# Patient Record
Sex: FEMALE | Race: OTHER | Employment: OTHER | URBAN - METROPOLITAN AREA
[De-identification: names, ages, dates, MRNs, and addresses within clinical notes are randomized per-mention and may not be internally consistent; named-entity substitution may affect disease eponyms.]

---

## 2024-01-05 ENCOUNTER — APPOINTMENT (OUTPATIENT)
Dept: GENERAL RADIOLOGY | Age: 84
DRG: 871 | End: 2024-01-05
Payer: COMMERCIAL

## 2024-01-05 ENCOUNTER — HOSPITAL ENCOUNTER (INPATIENT)
Age: 84
LOS: 1 days | DRG: 871 | End: 2024-01-06
Attending: INTERNAL MEDICINE | Admitting: INTERNAL MEDICINE
Payer: COMMERCIAL

## 2024-01-05 DIAGNOSIS — E86.0 DEHYDRATION: ICD-10-CM

## 2024-01-05 DIAGNOSIS — R79.89 ELEVATED TROPONIN: ICD-10-CM

## 2024-01-05 DIAGNOSIS — I46.9 CARDIAC ARREST (HCC): ICD-10-CM

## 2024-01-05 DIAGNOSIS — N17.9 AKI (ACUTE KIDNEY INJURY) (HCC): ICD-10-CM

## 2024-01-05 DIAGNOSIS — R79.89 ELEVATED BRAIN NATRIURETIC PEPTIDE (BNP) LEVEL: Primary | ICD-10-CM

## 2024-01-05 DIAGNOSIS — R05.1 ACUTE COUGH: ICD-10-CM

## 2024-01-05 PROBLEM — R57.9 SHOCK CIRCULATORY (HCC): Status: ACTIVE | Noted: 2024-01-05

## 2024-01-05 PROBLEM — E87.1 HYPONATREMIA: Status: ACTIVE | Noted: 2024-01-05

## 2024-01-05 PROBLEM — J18.9 PNEUMONIA, UNSPECIFIED ORGANISM: Status: ACTIVE | Noted: 2024-01-05

## 2024-01-05 PROBLEM — J90 PLEURAL EFFUSION, BILATERAL: Status: ACTIVE | Noted: 2024-01-05

## 2024-01-05 PROBLEM — J96.01 ACUTE RESPIRATORY FAILURE WITH HYPOXIA (HCC): Status: ACTIVE | Noted: 2024-01-05

## 2024-01-05 PROBLEM — T68.XXXA HYPOTHERMIA: Status: ACTIVE | Noted: 2024-01-05

## 2024-01-05 PROBLEM — E87.5 HYPERKALEMIA: Status: ACTIVE | Noted: 2024-01-05

## 2024-01-05 LAB
ALBUMIN SERPL-MCNC: 4 G/DL (ref 3.4–5)
ALBUMIN/GLOB SERPL: 1.3 {RATIO} (ref 1.1–2.2)
ALP SERPL-CCNC: 119 U/L (ref 40–129)
ALT SERPL-CCNC: 83 U/L (ref 10–40)
ANION GAP SERPL CALCULATED.3IONS-SCNC: 17 MMOL/L (ref 3–16)
AST SERPL-CCNC: 83 U/L (ref 15–37)
BASOPHILS # BLD: 0 K/UL (ref 0–0.2)
BASOPHILS NFR BLD: 0.2 %
BILIRUB SERPL-MCNC: 0.4 MG/DL (ref 0–1)
BUN SERPL-MCNC: 64 MG/DL (ref 7–20)
CALCIUM SERPL-MCNC: 9.4 MG/DL (ref 8.3–10.6)
CHLORIDE SERPL-SCNC: 93 MMOL/L (ref 99–110)
CO2 SERPL-SCNC: 16 MMOL/L (ref 21–32)
CREAT SERPL-MCNC: 1.3 MG/DL (ref 0.6–1.2)
DEPRECATED RDW RBC AUTO: 14.2 % (ref 12.4–15.4)
EKG ATRIAL RATE: 85 BPM
EKG DIAGNOSIS: NORMAL
EKG P AXIS: 66 DEGREES
EKG P-R INTERVAL: 142 MS
EKG Q-T INTERVAL: 400 MS
EKG QRS DURATION: 126 MS
EKG QTC CALCULATION (BAZETT): 476 MS
EKG R AXIS: 40 DEGREES
EKG T AXIS: 190 DEGREES
EKG VENTRICULAR RATE: 85 BPM
EOSINOPHIL # BLD: 0 K/UL (ref 0–0.6)
EOSINOPHIL NFR BLD: 0 %
FLUAV RNA RESP QL NAA+PROBE: NOT DETECTED
FLUBV RNA RESP QL NAA+PROBE: NOT DETECTED
GFR SERPLBLD CREATININE-BSD FMLA CKD-EPI: 41 ML/MIN/{1.73_M2}
GLUCOSE SERPL-MCNC: 222 MG/DL (ref 70–99)
HCT VFR BLD AUTO: 34.2 % (ref 36–48)
HGB BLD-MCNC: 10.8 G/DL (ref 12–16)
LACTATE BLDV-SCNC: 11 MMOL/L (ref 0.4–1.9)
LACTATE BLDV-SCNC: 4.8 MMOL/L (ref 0.4–1.9)
LACTATE BLDV-SCNC: 9.1 MMOL/L (ref 0.4–2)
LYMPHOCYTES # BLD: 0.7 K/UL (ref 1–5.1)
LYMPHOCYTES NFR BLD: 5.7 %
MAGNESIUM SERPL-MCNC: 2.7 MG/DL (ref 1.8–2.4)
MCH RBC QN AUTO: 32.5 PG (ref 26–34)
MCHC RBC AUTO-ENTMCNC: 31.7 G/DL (ref 31–36)
MCV RBC AUTO: 102.5 FL (ref 80–100)
MONOCYTES # BLD: 0.7 K/UL (ref 0–1.3)
MONOCYTES NFR BLD: 5.2 %
NEUTROPHILS # BLD: 11.4 K/UL (ref 1.7–7.7)
NEUTROPHILS NFR BLD: 88.9 %
NT-PROBNP SERPL-MCNC: ABNORMAL PG/ML (ref 0–449)
PLATELET # BLD AUTO: 294 K/UL (ref 135–450)
PMV BLD AUTO: 10.2 FL (ref 5–10.5)
POTASSIUM SERPL-SCNC: 5.7 MMOL/L (ref 3.5–5.1)
PROCALCITONIN SERPL IA-MCNC: 0.2 NG/ML (ref 0–0.15)
PROT SERPL-MCNC: 7.2 G/DL (ref 6.4–8.2)
RBC # BLD AUTO: 3.34 M/UL (ref 4–5.2)
SARS-COV-2 RNA RESP QL NAA+PROBE: NOT DETECTED
SODIUM SERPL-SCNC: 126 MMOL/L (ref 136–145)
TROPONIN, HIGH SENSITIVITY: 130 NG/L (ref 0–14)
TROPONIN, HIGH SENSITIVITY: 185 NG/L (ref 0–14)
WBC # BLD AUTO: 12.8 K/UL (ref 4–11)

## 2024-01-05 PROCEDURE — 2000000000 HC ICU R&B

## 2024-01-05 PROCEDURE — 93010 ELECTROCARDIOGRAM REPORT: CPT | Performed by: INTERNAL MEDICINE

## 2024-01-05 PROCEDURE — 6360000002 HC RX W HCPCS: Performed by: PHYSICIAN ASSISTANT

## 2024-01-05 PROCEDURE — 94003 VENT MGMT INPAT SUBQ DAY: CPT

## 2024-01-05 PROCEDURE — 71045 X-RAY EXAM CHEST 1 VIEW: CPT

## 2024-01-05 PROCEDURE — 96365 THER/PROPH/DIAG IV INF INIT: CPT

## 2024-01-05 PROCEDURE — 83605 ASSAY OF LACTIC ACID: CPT

## 2024-01-05 PROCEDURE — 94761 N-INVAS EAR/PLS OXIMETRY MLT: CPT

## 2024-01-05 PROCEDURE — 2580000003 HC RX 258: Performed by: PHYSICIAN ASSISTANT

## 2024-01-05 PROCEDURE — 2580000003 HC RX 258: Performed by: INTERNAL MEDICINE

## 2024-01-05 PROCEDURE — 84484 ASSAY OF TROPONIN QUANT: CPT

## 2024-01-05 PROCEDURE — 96374 THER/PROPH/DIAG INJ IV PUSH: CPT

## 2024-01-05 PROCEDURE — 51798 US URINE CAPACITY MEASURE: CPT

## 2024-01-05 PROCEDURE — 87636 SARSCOV2 & INF A&B AMP PRB: CPT

## 2024-01-05 PROCEDURE — 83735 ASSAY OF MAGNESIUM: CPT

## 2024-01-05 PROCEDURE — 2500000003 HC RX 250 WO HCPCS: Performed by: EMERGENCY MEDICINE

## 2024-01-05 PROCEDURE — 87040 BLOOD CULTURE FOR BACTERIA: CPT

## 2024-01-05 PROCEDURE — 85025 COMPLETE CBC W/AUTO DIFF WBC: CPT

## 2024-01-05 PROCEDURE — 0BH17EZ INSERTION OF ENDOTRACHEAL AIRWAY INTO TRACHEA, VIA NATURAL OR ARTIFICIAL OPENING: ICD-10-PCS | Performed by: INTERNAL MEDICINE

## 2024-01-05 PROCEDURE — 2500000003 HC RX 250 WO HCPCS: Performed by: PHYSICIAN ASSISTANT

## 2024-01-05 PROCEDURE — 6370000000 HC RX 637 (ALT 250 FOR IP): Performed by: PHYSICIAN ASSISTANT

## 2024-01-05 PROCEDURE — 6360000002 HC RX W HCPCS

## 2024-01-05 PROCEDURE — 80053 COMPREHEN METABOLIC PANEL: CPT

## 2024-01-05 PROCEDURE — 99285 EMERGENCY DEPT VISIT HI MDM: CPT

## 2024-01-05 PROCEDURE — 96361 HYDRATE IV INFUSION ADD-ON: CPT

## 2024-01-05 PROCEDURE — 31500 INSERT EMERGENCY AIRWAY: CPT

## 2024-01-05 PROCEDURE — 2700000000 HC OXYGEN THERAPY PER DAY

## 2024-01-05 PROCEDURE — 83880 ASSAY OF NATRIURETIC PEPTIDE: CPT

## 2024-01-05 PROCEDURE — 99223 1ST HOSP IP/OBS HIGH 75: CPT | Performed by: INTERNAL MEDICINE

## 2024-01-05 PROCEDURE — 02HV33Z INSERTION OF INFUSION DEVICE INTO SUPERIOR VENA CAVA, PERCUTANEOUS APPROACH: ICD-10-PCS | Performed by: INTERNAL MEDICINE

## 2024-01-05 PROCEDURE — 6360000002 HC RX W HCPCS: Performed by: EMERGENCY MEDICINE

## 2024-01-05 PROCEDURE — 96375 TX/PRO/DX INJ NEW DRUG ADDON: CPT

## 2024-01-05 PROCEDURE — 6370000000 HC RX 637 (ALT 250 FOR IP): Performed by: INTERNAL MEDICINE

## 2024-01-05 PROCEDURE — 5A1935Z RESPIRATORY VENTILATION, LESS THAN 24 CONSECUTIVE HOURS: ICD-10-PCS | Performed by: INTERNAL MEDICINE

## 2024-01-05 PROCEDURE — 93005 ELECTROCARDIOGRAM TRACING: CPT | Performed by: PHYSICIAN ASSISTANT

## 2024-01-05 PROCEDURE — 84145 PROCALCITONIN (PCT): CPT

## 2024-01-05 PROCEDURE — 36415 COLL VENOUS BLD VENIPUNCTURE: CPT

## 2024-01-05 PROCEDURE — 94002 VENT MGMT INPAT INIT DAY: CPT

## 2024-01-05 PROCEDURE — 5A12012 PERFORMANCE OF CARDIAC OUTPUT, SINGLE, MANUAL: ICD-10-PCS | Performed by: INTERNAL MEDICINE

## 2024-01-05 PROCEDURE — 96367 TX/PROPH/DG ADDL SEQ IV INF: CPT

## 2024-01-05 RX ORDER — ONDANSETRON 2 MG/ML
4 INJECTION INTRAMUSCULAR; INTRAVENOUS ONCE
Status: COMPLETED | OUTPATIENT
Start: 2024-01-05 | End: 2024-01-05

## 2024-01-05 RX ORDER — HEPARIN SODIUM 5000 [USP'U]/ML
5000 INJECTION, SOLUTION INTRAVENOUS; SUBCUTANEOUS 2 TIMES DAILY
Status: DISCONTINUED | OUTPATIENT
Start: 2024-01-06 | End: 2024-01-06

## 2024-01-05 RX ORDER — MIDAZOLAM HYDROCHLORIDE 1 MG/ML
1-10 INJECTION, SOLUTION INTRAVENOUS CONTINUOUS
Status: DISCONTINUED | OUTPATIENT
Start: 2024-01-05 | End: 2024-01-06

## 2024-01-05 RX ORDER — MORPHINE SULFATE 4 MG/ML
4 INJECTION, SOLUTION INTRAMUSCULAR; INTRAVENOUS ONCE
Status: COMPLETED | OUTPATIENT
Start: 2024-01-05 | End: 2024-01-05

## 2024-01-05 RX ORDER — ONDANSETRON 2 MG/ML
4 INJECTION INTRAMUSCULAR; INTRAVENOUS EVERY 6 HOURS PRN
Status: DISCONTINUED | OUTPATIENT
Start: 2024-01-05 | End: 2024-01-06

## 2024-01-05 RX ORDER — MORPHINE SULFATE 4 MG/ML
INJECTION, SOLUTION INTRAMUSCULAR; INTRAVENOUS
Status: COMPLETED
Start: 2024-01-05 | End: 2024-01-05

## 2024-01-05 RX ORDER — ACETAMINOPHEN 325 MG/1
650 TABLET ORAL EVERY 6 HOURS PRN
Status: DISCONTINUED | OUTPATIENT
Start: 2024-01-05 | End: 2024-01-07 | Stop reason: HOSPADM

## 2024-01-05 RX ORDER — SODIUM CHLORIDE 9 MG/ML
INJECTION, SOLUTION INTRAVENOUS PRN
Status: DISCONTINUED | OUTPATIENT
Start: 2024-01-05 | End: 2024-01-07 | Stop reason: HOSPADM

## 2024-01-05 RX ORDER — ONDANSETRON 4 MG/1
4 TABLET, ORALLY DISINTEGRATING ORAL EVERY 8 HOURS PRN
Status: DISCONTINUED | OUTPATIENT
Start: 2024-01-05 | End: 2024-01-06

## 2024-01-05 RX ORDER — SODIUM CHLORIDE 9 MG/ML
1000 INJECTION, SOLUTION INTRAVENOUS CONTINUOUS
Status: DISCONTINUED | OUTPATIENT
Start: 2024-01-05 | End: 2024-01-06

## 2024-01-05 RX ORDER — NALOXONE HYDROCHLORIDE 1 MG/ML
INJECTION INTRAMUSCULAR; INTRAVENOUS; SUBCUTANEOUS DAILY PRN
Status: COMPLETED | OUTPATIENT
Start: 2024-01-05 | End: 2024-01-05

## 2024-01-05 RX ORDER — ASPIRIN 81 MG/1
324 TABLET, CHEWABLE ORAL ONCE
Status: COMPLETED | OUTPATIENT
Start: 2024-01-05 | End: 2024-01-05

## 2024-01-05 RX ORDER — SODIUM CHLORIDE 0.9 % (FLUSH) 0.9 %
5-40 SYRINGE (ML) INJECTION EVERY 12 HOURS SCHEDULED
Status: DISCONTINUED | OUTPATIENT
Start: 2024-01-05 | End: 2024-01-07 | Stop reason: HOSPADM

## 2024-01-05 RX ORDER — EPINEPHRINE IN SOD CHLOR,ISO 1 MG/10 ML
SYRINGE (ML) INTRAVENOUS DAILY PRN
Status: COMPLETED | OUTPATIENT
Start: 2024-01-05 | End: 2024-01-05

## 2024-01-05 RX ORDER — SODIUM CHLORIDE 0.9 % (FLUSH) 0.9 %
5-40 SYRINGE (ML) INJECTION PRN
Status: DISCONTINUED | OUTPATIENT
Start: 2024-01-05 | End: 2024-01-07 | Stop reason: HOSPADM

## 2024-01-05 RX ORDER — ENOXAPARIN SODIUM 100 MG/ML
30 INJECTION SUBCUTANEOUS DAILY
Status: DISCONTINUED | OUTPATIENT
Start: 2024-01-05 | End: 2024-01-05

## 2024-01-05 RX ORDER — AZITHROMYCIN 250 MG/1
500 TABLET, FILM COATED ORAL EVERY 24 HOURS
Status: DISCONTINUED | OUTPATIENT
Start: 2024-01-06 | End: 2024-01-06

## 2024-01-05 RX ORDER — ONDANSETRON 2 MG/ML
4 INJECTION INTRAMUSCULAR; INTRAVENOUS ONCE
Status: DISCONTINUED | OUTPATIENT
Start: 2024-01-05 | End: 2024-01-05

## 2024-01-05 RX ORDER — MORPHINE SULFATE 4 MG/ML
4 INJECTION, SOLUTION INTRAMUSCULAR; INTRAVENOUS ONCE
Status: DISCONTINUED | OUTPATIENT
Start: 2024-01-05 | End: 2024-01-05

## 2024-01-05 RX ORDER — ACETAMINOPHEN 650 MG/1
650 SUPPOSITORY RECTAL EVERY 6 HOURS PRN
Status: DISCONTINUED | OUTPATIENT
Start: 2024-01-05 | End: 2024-01-07 | Stop reason: HOSPADM

## 2024-01-05 RX ORDER — CALCIUM GLUCONATE 94 MG/ML
1000 INJECTION, SOLUTION INTRAVENOUS ONCE
Status: COMPLETED | OUTPATIENT
Start: 2024-01-05 | End: 2024-01-05

## 2024-01-05 RX ORDER — ONDANSETRON 2 MG/ML
INJECTION INTRAMUSCULAR; INTRAVENOUS
Status: COMPLETED
Start: 2024-01-05 | End: 2024-01-05

## 2024-01-05 RX ORDER — HEPARIN SODIUM 5000 [USP'U]/ML
5000 INJECTION, SOLUTION INTRAVENOUS; SUBCUTANEOUS EVERY 8 HOURS SCHEDULED
Status: DISCONTINUED | OUTPATIENT
Start: 2024-01-06 | End: 2024-01-05

## 2024-01-05 RX ORDER — POLYETHYLENE GLYCOL 3350 17 G/17G
17 POWDER, FOR SOLUTION ORAL DAILY PRN
Status: DISCONTINUED | OUTPATIENT
Start: 2024-01-05 | End: 2024-01-07 | Stop reason: HOSPADM

## 2024-01-05 RX ADMIN — EPINEPHRINE 1 MG: 0.1 INJECTION, SOLUTION ENDOTRACHEAL; INTRACARDIAC; INTRAVENOUS at 13:18

## 2024-01-05 RX ADMIN — SODIUM BICARBONATE 50 MEQ: 84 INJECTION, SOLUTION INTRAVENOUS at 13:20

## 2024-01-05 RX ADMIN — AZITHROMYCIN MONOHYDRATE 500 MG: 500 INJECTION, POWDER, LYOPHILIZED, FOR SOLUTION INTRAVENOUS at 12:05

## 2024-01-05 RX ADMIN — ONDANSETRON 4 MG: 2 INJECTION INTRAMUSCULAR; INTRAVENOUS at 12:55

## 2024-01-05 RX ADMIN — NALOXONE HYDROCHLORIDE 0.2 MG: 1 INJECTION PARENTERAL at 13:15

## 2024-01-05 RX ADMIN — EPINEPHRINE 1 MG: 0.1 INJECTION, SOLUTION ENDOTRACHEAL; INTRACARDIAC; INTRAVENOUS at 13:25

## 2024-01-05 RX ADMIN — MUPIROCIN: 20 OINTMENT TOPICAL at 20:31

## 2024-01-05 RX ADMIN — MORPHINE SULFATE 4 MG: 4 INJECTION, SOLUTION INTRAMUSCULAR; INTRAVENOUS at 12:56

## 2024-01-05 RX ADMIN — EPINEPHRINE 2 MCG/MIN: 1 INJECTION INTRAMUSCULAR; INTRAVENOUS; SUBCUTANEOUS at 13:49

## 2024-01-05 RX ADMIN — EPINEPHRINE 1 MG: 0.1 INJECTION, SOLUTION ENDOTRACHEAL; INTRACARDIAC; INTRAVENOUS at 14:12

## 2024-01-05 RX ADMIN — SODIUM CHLORIDE 1000 ML: 9 INJECTION, SOLUTION INTRAVENOUS at 10:06

## 2024-01-05 RX ADMIN — SODIUM CHLORIDE 5 MCG/MIN: 9 INJECTION, SOLUTION INTRAVENOUS at 14:04

## 2024-01-05 RX ADMIN — SODIUM ZIRCONIUM CYCLOSILICATE 10 G: 10 POWDER, FOR SUSPENSION ORAL at 09:50

## 2024-01-05 RX ADMIN — CALCIUM GLUCONATE 1000 MG: 98 INJECTION, SOLUTION INTRAVENOUS at 09:51

## 2024-01-05 RX ADMIN — Medication 10 ML: at 20:32

## 2024-01-05 RX ADMIN — CEFTRIAXONE SODIUM 1000 MG: 1 INJECTION, POWDER, FOR SOLUTION INTRAMUSCULAR; INTRAVENOUS at 11:32

## 2024-01-05 RX ADMIN — EPINEPHRINE 1 MG: 0.1 INJECTION, SOLUTION ENDOTRACHEAL; INTRACARDIAC; INTRAVENOUS at 13:21

## 2024-01-05 RX ADMIN — SODIUM BICARBONATE 50 MEQ: 84 INJECTION, SOLUTION INTRAVENOUS at 13:25

## 2024-01-05 RX ADMIN — ONDANSETRON 4 MG: 2 INJECTION INTRAMUSCULAR; INTRAVENOUS at 09:02

## 2024-01-05 RX ADMIN — MIDAZOLAM IN SODIUM CHLORIDE 2 MG/HR: 1 INJECTION INTRAVENOUS at 14:20

## 2024-01-05 RX ADMIN — MORPHINE SULFATE 4 MG: 4 INJECTION, SOLUTION INTRAMUSCULAR; INTRAVENOUS at 09:03

## 2024-01-05 RX ADMIN — ASPIRIN 81 MG 324 MG: 81 TABLET ORAL at 09:51

## 2024-01-05 ASSESSMENT — PULMONARY FUNCTION TESTS
PIF_VALUE: 11
PIF_VALUE: 15
PIF_VALUE: 21
PIF_VALUE: 21
PIF_VALUE: 27
PIF_VALUE: 17
PIF_VALUE: 18
PIF_VALUE: 14
PIF_VALUE: 14
PIF_VALUE: 19
PIF_VALUE: 20
PIF_VALUE: 19
PIF_VALUE: 14
PIF_VALUE: 18

## 2024-01-05 ASSESSMENT — PAIN - FUNCTIONAL ASSESSMENT: PAIN_FUNCTIONAL_ASSESSMENT: 0-10

## 2024-01-05 ASSESSMENT — PAIN SCALES - GENERAL
PAINLEVEL_OUTOF10: 4
PAINLEVEL_OUTOF10: 0

## 2024-01-05 NOTE — PROGRESS NOTES
PULM/CCM    Patient's clinical status prognosis and options discussed in detail with patient's family and after discussion patient's CODE STATUS been changed to DNR CC  Case discussed with ER providers    Norbert Celeste MD

## 2024-01-05 NOTE — ED PROVIDER NOTES
[facility] C2 CARD TELEMETRY  Emergency Department Encounter    Patient Name: Veronica Barton  MRN: [id number]  YOB: 1940  Date of Evaluation: 1/5/2024  Provider: Cal Raphael MD  Note Started: 8:41 AM EST 1/5/24    CHIEF COMPLAINT  Shortness of Breath (Edema in lower extremities, sob, medics were unable to get room air sat, sick the past couple of days. Lost appetite, )    SHARED SERVICE VISIT  Evaluated by GIOVANNI.  My supervising physician was available for consultation.     HISTORY OF PRESENT ILLNESS  Veronica Barton is a 83 y.o. female who presents to the ED several day history of URI-like symptoms.  Patient with worsening shortness of breath today.  Patient speaks Gujarati and history obtained through help of daughter-in-law.  Patient with several day history of malaise and fatigue with decreased appetite.  Has had mild cough.  Nonproductive.  Non-smoker.  Is also noted some lower extremity swelling.  Patient reports some mild chest tightness and pain in the upper back.  Mildly lightheaded without dizziness or confusion.  No nasal congestion or sore throat.  They deny fevers chills.  Patient denies abdominal pain.  No nausea vomiting.  Mild burning with urination.  Several family members with URI symptoms recently.    No other complaints, modifying factors or associated symptoms.     Nursing notes reviewed were all reviewed and agreed with or any disagreements were addressed in the HPI.    PMH:  Past Medical History:   Diagnosis Date    Thyroid disease        Surgical History:  History reviewed. No pertinent surgical history.    Family History:  History reviewed. No pertinent family history.    Social History:  Social History     Socioeconomic History    Marital status:      Spouse name: Not on file    Number of children: Not on file    Years of education: Not on file    Highest education level: Not on file   Occupational History    Not on file   Tobacco Use    Smoking status: Never

## 2024-01-05 NOTE — PROGRESS NOTES
1720 Received to ICU via bed from ED, Intubated & vented. CHG bath done, skin checked. Oral suctioned & oral care given. Hair combed. RT at bedside monitoring sats. Sat currently %. Turned & repositioned with pillows for support. IV medications- Levo @ 11, Epi @ 8 and Versed @ 2.  1745  Family at bedside & updated.  Samara Carrion RN

## 2024-01-05 NOTE — PROGRESS NOTES
01/05/24 1715   Patient Observation   Pulse 95   Respirations 18   SpO2 100 %   Vent Information   Vent Mode AC/VC   $Ventilation $Initial Day   Ventilator Settings   FiO2  100 %   Vt (Set, mL) 350 mL   Resp Rate (Set) 18 bpm   PEEP/CPAP (cmH2O) 5   Vent Patient Data (Readings)   Vt (Measured) 525 mL   Peak Inspiratory Pressure (cmH2O) 19 cmH2O   Rate Measured 20 br/min   Minute Volume (L/min) 7.61 Liters   Mean Airway Pressure (cmH2O) 9.1 cmH20   Plateau Pressure (cm H2O) 0 cm H2O   Driving Pressure -5   I:E Ratio 1:1.90   I Time/ I Time % 1.1 s   Vent Alarm Settings   High Pressure (cmH2O) 45 cmH2O   Low Minute Volume (lpm) 2 L/min   Low Exhaled Vt (ml) 200 mL   RR High (bpm) 40 br/min   Apnea (secs) 20 secs   Additional Respiratoray Assessments   Humidification Source HME   Ambu Bag With Mask At Bedside Yes   Airway Clearance   Suction ET Tube   Suction Device Inline suction catheter   Sputum Method Obtained Endotracheal   Sputum Amount Small   Sputum Color/Odor White   Sputum Consistency Thin

## 2024-01-05 NOTE — H&P
support for now. Vasopressor support, empiric Abx. Bicarb was given during ACLS. Prognosis is grim given multiple arrests in this setting. Family initially elected DNR status. Currently listed as Limited but would need to further explore wishes regarding re-intubation pending clinical course. Will continue above aggressive measures pending any change in goals of care. Further management per Critical Care team.     Acute Respiratory Failure with Hypoxia: 2/2 Pneumonia and Cardiac Arrest. Uncertain sequence of events, but pneumonia respiratory distress/arrest seemed to precede the Cardiac Arrest per ED report. PEA Arrest presumably due to hypoxia, hypotension and/or acidosis. Despite the BNP of 70,000, does not appears to be pulmonary edema on CXR. Continue vent support per Critical Care.     Pneumonia: Continue empiric Abx. BCx's pending. Flu/COVID testing was negative.     Elevated Troponin: Most likely demand ischemia in setting of Pneumonia/Sepsis. Technically new LBBB, but primary ACS seems unlikely based on the context and downward troponin trend, therefore risks of AC felt to outweigh benefits.     CONSTANTINO: Continue volume expansion. Monitor.       Discussed management and the need for Hospitalization of the patient w/ the Emergency Department Provider: MELISSA Valverde and Dr Dubose. Discussed management with Critical Care team.       CXR: I have reviewed the CXR with the following interpretation: Dense right basilar pneumonia  EKG:  I have reviewed the EKG with the following interpretation: NSR, left bundle branch block. Only comparison from 2014 which did not show the LBBB.     Physical Exam Performed:      BP (!) 86/54   Pulse (!) 107   Temp 97 °F (36.1 °C) (Rectal)   Resp 19   Wt 40.4 kg (89 lb)   SpO2 100%   BMI 16.28 kg/m²   General appearance: Intubated, sedated, increased work of breathing.   HENT: Oropharynx clear, mucous membranes moist.  Respiratory: On vent. Limited examination, grossly  Recommend follow-up to resolution. Cardiomegaly and bilateral pleural effusions may be related to mild CHF.       PCP: Cal Raphael MD    Past Medical History:        Diagnosis Date    Thyroid disease        Past Surgical History:    History reviewed. No pertinent surgical history.    Medications Prior to Admission:   Prior to Admission medications    Medication Sig Start Date End Date Taking? Authorizing Provider   dicyclomine (BENTYL) 10 MG capsule Take 10 mg by mouth nightly.    Provider, MD Hazel       Labs: Personally reviewed and interpreted for clinical significance.   Recent Labs     01/05/24  0843   WBC 12.8*   HGB 10.8*   HCT 34.2*        Recent Labs     01/05/24  0843   *   K 5.7*   CL 93*   CO2 16*   BUN 64*   CREATININE 1.3*   CALCIUM 9.4   MG 2.70*     Recent Labs     01/05/24  0843 01/05/24  1056   PROBNP >70,000*  --    TROPHS 185* 130*     No results for input(s): \"LABA1C\" in the last 72 hours.  Recent Labs     01/05/24  0843   AST 83*   ALT 83*   BILITOT 0.4   ALKPHOS 119     No results for input(s): \"INR\", \"LACTA\", \"TSH\" in the last 72 hours.     Tommy Wray MD

## 2024-01-05 NOTE — ED NOTES
Patient adjusted in bed for comfort. She is on a purewick for urine collection. None noted at this time. Warm blankets applied. Patient denies any pain at this time. Daughter in law at pt bedside assisting with any questions.

## 2024-01-05 NOTE — CONSULTS
INPATIENT PULMONARY CRITICAL CARE CONSULT NOTE      Chief Complaint/Referring Provider:  Patient is being seen at the request of Dr. Wray for a consultation for Acute respiratory failure       Presenting HPI: Patient was brought to the hospital because of increasing shortness of breath and leg edema      As per ER provider-Veronica Barton is a 83 y.o. female who presents to the ED several day history of URI-like symptoms.  Patient with worsening shortness of breath today.  Patient speaks Gujarati and history obtained through help of daughter-in-law.  Patient with several day history of malaise and fatigue with decreased appetite.  Has had mild cough.  Nonproductive.  Non-smoker.  Is also noted some lower extremity swelling.  Patient reports some mild chest tightness and pain in the upper back.  Mildly lightheaded without dizziness or confusion.  No nasal congestion or sore throat.  They deny fevers chills.  Patient denies abdominal pain.  No nausea vomiting.  Mild burning with urination.  Several family members with URI symptoms recently.     No other complaints, modifying factors or associated symptoms.     Patient apparently decompensated in the ER and patient had cardiopulmonary arrest and patient was resuscitated as per ACS protocol with establishment of ROSC, patient was put on mechanical vent support, patient when seen in the ER was critically ill on mechanical support, patient was having increased work of breathing on the ventilator, patient was on 100% oxygen with saturation 94% in the ER, patient also was hypothermic with temperature of 94.4 °F, patient was also hypotensive with systolic blood pressure of 60 mmHg, patient had sinus rhythm on the monitor, patient was started on IV pressors by the ER provider, patient's blood sugar was slightly on the high side, no other pertinent review of system could be obtained because of patient clinical status which was precarious and critical       Patient Active  Problem List    Diagnosis Date Noted    Pneumonia, unspecified organism 01/05/2024    Cardiopulmonary arrest with successful resuscitation ([facility]) 01/05/2024    Hypothermia 01/05/2024    Elevated brain natriuretic peptide (BNP) level 01/05/2024    Acute respiratory failure with hypoxia ([facility]) 01/05/2024    Shock circulatory ([facility]) 01/05/2024    Hyponatremia 01/05/2024    Elevated lactic acid level 01/05/2024    Hyperkalemia 01/05/2024    CONSTANTINO (acute kidney injury) ([facility]) 01/05/2024    Elevated troponin 01/05/2024    Pleural effusion, bilateral 01/05/2024       Past Medical History:   Diagnosis Date    Thyroid disease         History reviewed. No pertinent surgical history.     History reviewed. No pertinent family history.     Social History     Tobacco Use    Smoking status: Never    Smokeless tobacco: Not on file   Substance Use Topics    Alcohol use: No        No Known Allergies            Physical Exam:  Blood pressure (!) 60/49, pulse 67, temperature (!) 94.7 °F (34.8 °C), resp. rate 21, weight 40.4 kg (89 lb), SpO2 99 %.'     Constitutional: In significant respiratory distress on mechanical vent support when seen  HENT: Endotracheal tube present no thyromegaly.  Eyes:  Conjunctivae are normal. Pupils equal, round, and reactive to light. No scleral icterus.   Neck: . No tracheal deviation present. No obvious thyroid mass.   Cardiovascular: Normal rate, regular rhythm, normal heart sounds.  No right ventricular heave.  At least 1+Atleast 1+ lower extremity edema.  Pulmonary/Chest: No wheezes.  Mild lateral rales.  Chest wall is not dull to percussion.  Increased accessory muscle usage, no stridor.  Decreased breath sound intensity  Abdominal: Soft. Bowel sounds present. No distension or hernia. No tenderness.    Musculoskeletal: No cyanosis. No clubbing. No obvious joint deformity.   Lymphadenopathy: No cervical or supraclavicular adenopathy.   Skin: Skin is warm and dry. No rash or nodules on the exposed

## 2024-01-05 NOTE — ED PROVIDER NOTES
The Ekg interpreted by me shows  normal sinus rhythm with a rate of 85  Axis is   Normal  QTc is  476 msec  Intervals and Durations are unremarkable.      ST Segments: T wave inversion in leads I, aVL with ST T wave depression in V6.  When compared to an EKG performed on April 20, 2014 these changes are new    1400 Hrs - Addendum.  CODE BLUE was activated for room 2.  I heard the alarm and went into the room.  This patient was undergoing CPR.  I intubated the patient under implied consent.  I then directed the code.  The patient appeared to have slow rate on the monitor but no pulse (PEA).  ACLS was instituted.  After the third round bedside ultrasound was performed and the patient had evidence of organized cardiac activity with a pulse.  I spoke to the patient's family member who was in attendance in the ED along with Chloe who is a family member and a CVICU nurse.  They had asked me to stop CPR if the patient did not have a pulse at the next pulse check.  Given that she did have a pulse I asked them if they wanted to change the patient's CODE STATUS to DNR CC.  They plan to conference with the family and get back to [facility].  This patient has been accepted by the hospitalist for admission.  They were notified of the change in the patient's status.  The GIOVANNI is placed in a central line at bedside.    Intubation    Date/Time: 1/5/2024 2:03 PM    Performed by: Joseluis Dubose MD  Authorized by: Tommy Wray MD    Consent:     Consent obtained:  Emergent situation  Universal protocol:     Patient identity confirmed:  Verbally with patient and arm band  Pre-procedure details:     Indications: cardio/pulmonary arrest      Induction agents:  None    Paralytics:  None  Procedure details:     Preoxygenation:  Bag valve mask    CPR in progress: yes      Number of attempts:  1  Successful intubation attempt details:     Intubation method:  Oral    Intubation technique: video assisted      Laryngoscope blade:   Mac 4    Bougie used: no      Grade view: I      Tube size (mm):  7.5    Tube type:  Cuffed    Tube visualized through cords: yes    Placement assessment:     Tube secured with:  ETT burkett    Breath sounds:  Equal    Placement verification: direct visualization and equal breath sounds    Post-procedure details:     Procedure completion:  Tolerated well, no immediate complications    CRITICAL CARE TIME   I personally saw the patient and independently provided 20 minutes of non-concurrent critical care out of the total shared critical care time provided.  There was a high probability of clinically significant/life threatening deterioration in the patient's condition which required my urgent intervention.           Sedrick, Joseluis Lezama MD  01/05/24 4702

## 2024-01-05 NOTE — PROGRESS NOTES
PULM/CCM     Patient's family as per nursing changed the code status to limited code with no CPR and patient was transferred to ICU for further care     Norbert Celeste MD

## 2024-01-06 VITALS
HEIGHT: 58 IN | HEART RATE: 51 BPM | WEIGHT: 100.09 LBS | RESPIRATION RATE: 25 BRPM | OXYGEN SATURATION: 83 % | BODY MASS INDEX: 21.01 KG/M2 | SYSTOLIC BLOOD PRESSURE: 85 MMHG | DIASTOLIC BLOOD PRESSURE: 53 MMHG | TEMPERATURE: 99.4 F

## 2024-01-06 PROBLEM — D69.6 THROMBOCYTOPENIA (HCC): Status: ACTIVE | Noted: 2024-01-06

## 2024-01-06 PROBLEM — I35.0 AORTIC STENOSIS: Status: ACTIVE | Noted: 2024-01-06

## 2024-01-06 PROBLEM — I42.9 CARDIOMYOPATHY (HCC): Status: ACTIVE | Noted: 2024-01-06

## 2024-01-06 LAB
ANION GAP SERPL CALCULATED.3IONS-SCNC: 12 MMOL/L (ref 3–16)
BACTERIA BLD CULT ORG #2: NORMAL
BACTERIA BLD CULT: NORMAL
BASE EXCESS BLDA CALC-SCNC: -4.6 MMOL/L (ref -3–3)
BASOPHILS # BLD: 0 K/UL (ref 0–0.2)
BASOPHILS NFR BLD: 0 %
BUN SERPL-MCNC: 76 MG/DL (ref 7–20)
CALCIUM SERPL-MCNC: 8 MG/DL (ref 8.3–10.6)
CHLORIDE SERPL-SCNC: 99 MMOL/L (ref 99–110)
CO2 BLDA-SCNC: 19.6 MMOL/L
CO2 SERPL-SCNC: 20 MMOL/L (ref 21–32)
COHGB MFR BLDA: 0.2 % (ref 0–1.5)
CREAT SERPL-MCNC: 1.9 MG/DL (ref 0.6–1.2)
DEPRECATED RDW RBC AUTO: 13.6 % (ref 12.4–15.4)
EOSINOPHIL # BLD: 0 K/UL (ref 0–0.6)
EOSINOPHIL NFR BLD: 0 %
GFR SERPLBLD CREATININE-BSD FMLA CKD-EPI: 26 ML/MIN/{1.73_M2}
GLUCOSE SERPL-MCNC: 114 MG/DL (ref 70–99)
HCO3 BLDA-SCNC: 18.7 MMOL/L (ref 21–29)
HCT VFR BLD AUTO: 28.2 % (ref 36–48)
HGB BLD-MCNC: 9.3 G/DL (ref 12–16)
HGB BLDA-MCNC: 10.6 G/DL (ref 12–16)
LYMPHOCYTES # BLD: 0.7 K/UL (ref 1–5.1)
LYMPHOCYTES NFR BLD: 5 %
MACROCYTES BLD QL SMEAR: ABNORMAL
MCH RBC QN AUTO: 33.1 PG (ref 26–34)
MCHC RBC AUTO-ENTMCNC: 33 G/DL (ref 31–36)
MCV RBC AUTO: 100.3 FL (ref 80–100)
METHGB MFR BLDA: 0.9 %
MONOCYTES # BLD: 0.1 K/UL (ref 0–1.3)
MONOCYTES NFR BLD: 1 %
NEUTROPHILS # BLD: 12.7 K/UL (ref 1.7–7.7)
NEUTROPHILS NFR BLD: 60 %
NEUTS BAND NFR BLD MANUAL: 34 % (ref 0–7)
O2 THERAPY: ABNORMAL
OVALOCYTES BLD QL SMEAR: ABNORMAL
PCO2 BLDA: 28.7 MMHG (ref 35–45)
PH BLDA: 7.43 [PH] (ref 7.35–7.45)
PLATELET # BLD AUTO: 112 K/UL (ref 135–450)
PLATELET BLD QL SMEAR: ABNORMAL
PMV BLD AUTO: 9 FL (ref 5–10.5)
PO2 BLDA: 79.5 MMHG (ref 75–108)
POTASSIUM SERPL-SCNC: 5.5 MMOL/L (ref 3.5–5.1)
RBC # BLD AUTO: 2.82 M/UL (ref 4–5.2)
SAO2 % BLDA: 94 %
SLIDE REVIEW: ABNORMAL
SODIUM SERPL-SCNC: 131 MMOL/L (ref 136–145)
WBC # BLD AUTO: 13.5 K/UL (ref 4–11)

## 2024-01-06 PROCEDURE — 6360000002 HC RX W HCPCS: Performed by: INTERNAL MEDICINE

## 2024-01-06 PROCEDURE — 6360000002 HC RX W HCPCS: Performed by: PHYSICIAN ASSISTANT

## 2024-01-06 PROCEDURE — 94003 VENT MGMT INPAT SUBQ DAY: CPT

## 2024-01-06 PROCEDURE — 85025 COMPLETE CBC W/AUTO DIFF WBC: CPT

## 2024-01-06 PROCEDURE — 80048 BASIC METABOLIC PNL TOTAL CA: CPT

## 2024-01-06 PROCEDURE — 36592 COLLECT BLOOD FROM PICC: CPT

## 2024-01-06 PROCEDURE — 2580000003 HC RX 258: Performed by: INTERNAL MEDICINE

## 2024-01-06 PROCEDURE — 99291 CRITICAL CARE FIRST HOUR: CPT | Performed by: INTERNAL MEDICINE

## 2024-01-06 PROCEDURE — 6370000000 HC RX 637 (ALT 250 FOR IP): Performed by: INTERNAL MEDICINE

## 2024-01-06 PROCEDURE — 82803 BLOOD GASES ANY COMBINATION: CPT

## 2024-01-06 PROCEDURE — 93306 TTE W/DOPPLER COMPLETE: CPT

## 2024-01-06 PROCEDURE — 2580000003 HC RX 258: Performed by: PHYSICIAN ASSISTANT

## 2024-01-06 RX ORDER — MIDAZOLAM HYDROCHLORIDE 1 MG/ML
2 INJECTION INTRAMUSCULAR; INTRAVENOUS ONCE
Status: COMPLETED | OUTPATIENT
Start: 2024-01-06 | End: 2024-01-06

## 2024-01-06 RX ORDER — LORAZEPAM 2 MG/ML
0.5 INJECTION INTRAMUSCULAR
Status: DISCONTINUED | OUTPATIENT
Start: 2024-01-06 | End: 2024-01-07 | Stop reason: HOSPADM

## 2024-01-06 RX ORDER — MIDAZOLAM HYDROCHLORIDE 1 MG/ML
2 INJECTION INTRAMUSCULAR; INTRAVENOUS
Status: DISCONTINUED | OUTPATIENT
Start: 2024-01-06 | End: 2024-01-07 | Stop reason: HOSPADM

## 2024-01-06 RX ORDER — GLYCOPYRROLATE 0.2 MG/ML
0.2 INJECTION INTRAMUSCULAR; INTRAVENOUS EVERY 4 HOURS PRN
Status: DISCONTINUED | OUTPATIENT
Start: 2024-01-06 | End: 2024-01-07 | Stop reason: HOSPADM

## 2024-01-06 RX ORDER — FENTANYL CITRATE 50 UG/ML
25 INJECTION, SOLUTION INTRAMUSCULAR; INTRAVENOUS EVERY 10 MIN PRN
Status: DISCONTINUED | OUTPATIENT
Start: 2024-01-06 | End: 2024-01-07 | Stop reason: HOSPADM

## 2024-01-06 RX ORDER — FENTANYL CITRATE 50 UG/ML
50 INJECTION, SOLUTION INTRAMUSCULAR; INTRAVENOUS EVERY 10 MIN PRN
Status: DISCONTINUED | OUTPATIENT
Start: 2024-01-06 | End: 2024-01-07 | Stop reason: HOSPADM

## 2024-01-06 RX ORDER — ONDANSETRON 2 MG/ML
4 INJECTION INTRAMUSCULAR; INTRAVENOUS EVERY 6 HOURS PRN
Status: DISCONTINUED | OUTPATIENT
Start: 2024-01-06 | End: 2024-01-07 | Stop reason: HOSPADM

## 2024-01-06 RX ORDER — MORPHINE SULFATE 4 MG/ML
4 INJECTION, SOLUTION INTRAMUSCULAR; INTRAVENOUS ONCE
Status: DISCONTINUED | OUTPATIENT
Start: 2024-01-06 | End: 2024-01-06

## 2024-01-06 RX ORDER — MORPHINE SULFATE 2 MG/ML
2 INJECTION, SOLUTION INTRAMUSCULAR; INTRAVENOUS
Status: DISCONTINUED | OUTPATIENT
Start: 2024-01-06 | End: 2024-01-07 | Stop reason: HOSPADM

## 2024-01-06 RX ADMIN — FENTANYL CITRATE 50 MCG: 50 INJECTION INTRAMUSCULAR; INTRAVENOUS at 16:19

## 2024-01-06 RX ADMIN — Medication 10 ML: at 09:21

## 2024-01-06 RX ADMIN — MIDAZOLAM 2 MG: 1 INJECTION INTRAMUSCULAR; INTRAVENOUS at 11:43

## 2024-01-06 RX ADMIN — FENTANYL CITRATE 50 MCG: 50 INJECTION INTRAMUSCULAR; INTRAVENOUS at 11:32

## 2024-01-06 RX ADMIN — MIDAZOLAM 2 MG: 1 INJECTION INTRAMUSCULAR; INTRAVENOUS at 13:08

## 2024-01-06 RX ADMIN — MIDAZOLAM 2 MG: 1 INJECTION INTRAMUSCULAR; INTRAVENOUS at 18:05

## 2024-01-06 RX ADMIN — MIDAZOLAM 2 MG: 1 INJECTION INTRAMUSCULAR; INTRAVENOUS at 18:52

## 2024-01-06 RX ADMIN — CEFTRIAXONE SODIUM 1000 MG: 1 INJECTION, POWDER, FOR SOLUTION INTRAMUSCULAR; INTRAVENOUS at 09:07

## 2024-01-06 RX ADMIN — AZITHROMYCIN 500 MG: 250 TABLET, FILM COATED ORAL at 09:09

## 2024-01-06 RX ADMIN — MORPHINE SULFATE 2 MG: 2 INJECTION, SOLUTION INTRAMUSCULAR; INTRAVENOUS at 12:15

## 2024-01-06 RX ADMIN — HEPARIN SODIUM 5000 UNITS: 5000 INJECTION INTRAVENOUS; SUBCUTANEOUS at 09:07

## 2024-01-06 RX ADMIN — MORPHINE SULFATE 2 MG: 2 INJECTION, SOLUTION INTRAMUSCULAR; INTRAVENOUS at 14:25

## 2024-01-06 RX ADMIN — FENTANYL CITRATE 50 MCG: 50 INJECTION INTRAMUSCULAR; INTRAVENOUS at 12:29

## 2024-01-06 RX ADMIN — EPINEPHRINE 3 MCG/MIN: 1 INJECTION INTRAMUSCULAR; INTRAVENOUS; SUBCUTANEOUS at 01:28

## 2024-01-06 RX ADMIN — MORPHINE SULFATE 2 MG: 2 INJECTION, SOLUTION INTRAMUSCULAR; INTRAVENOUS at 18:52

## 2024-01-06 RX ADMIN — MIDAZOLAM 2 MG: 1 INJECTION INTRAMUSCULAR; INTRAVENOUS at 16:18

## 2024-01-06 RX ADMIN — LORAZEPAM 0.5 MG: 2 INJECTION INTRAMUSCULAR; INTRAVENOUS at 14:24

## 2024-01-06 RX ADMIN — FENTANYL CITRATE 25 MCG: 50 INJECTION INTRAMUSCULAR; INTRAVENOUS at 11:15

## 2024-01-06 RX ADMIN — GLYCOPYRROLATE 0.2 MG: 0.2 INJECTION INTRAMUSCULAR; INTRAVENOUS at 11:15

## 2024-01-06 RX ADMIN — MORPHINE SULFATE 2 MG: 2 INJECTION, SOLUTION INTRAMUSCULAR; INTRAVENOUS at 18:05

## 2024-01-06 ASSESSMENT — PULMONARY FUNCTION TESTS
PIF_VALUE: 13
PIF_VALUE: 14
PIF_VALUE: 12
PIF_VALUE: 16
PIF_VALUE: 11
PIF_VALUE: 11
PIF_VALUE: 14
PIF_VALUE: 17
PIF_VALUE: 28
PIF_VALUE: 16
PIF_VALUE: 13
PIF_VALUE: 15
PIF_VALUE: 13
PIF_VALUE: 17
PIF_VALUE: 12
PIF_VALUE: 11
PIF_VALUE: 17

## 2024-01-06 NOTE — PLAN OF CARE
Problem: Discharge Planning  Goal: Discharge to home or other facility with appropriate resources  1/6/2024 0319 by Masha Sanders RN  Outcome: Progressing  1/5/2024 2008 by Samara Carrion, RN  Outcome: Progressing  Flowsheets  Taken 1/5/2024 2000 by Masha Sanders, RN  Discharge to home or other facility with appropriate resources: Refer to discharge planning if patient needs post-hospital services based on physician order or complex needs related to functional status, cognitive ability or social support system  Taken 1/5/2024 1720 by Samara Carrion, RN  Discharge to home or other facility with appropriate resources: Refer to discharge planning if patient needs post-hospital services based on physician order or complex needs related to functional status, cognitive ability or social support system     Problem: Skin/Tissue Integrity  Goal: Absence of new skin breakdown  Description: 1.  Monitor for areas of redness and/or skin breakdown  2.  Assess vascular access sites hourly  3.  Every 4-6 hours minimum:  Change oxygen saturation probe site  4.  Every 4-6 hours:  If on nasal continuous positive airway pressure, respiratory therapy assess nares and determine need for appliance change or resting period.  1/6/2024 0319 by Masha Sanders, RN  Outcome: Progressing  1/5/2024 2008 by Samara Carrion, RN  Outcome: Progressing     Problem: Safety - Adult  Goal: Free from fall injury  Outcome: Progressing

## 2024-01-06 NOTE — PROGRESS NOTES
Wasted 85mL of Midazolam with Roslyn Bolanos RN. Med disposed of into the Rx Destroyer per protocol.    Primary Nurse eSignature: Electronically signed by Masha Sanders RN on 1/6/24 at 3:32 AM EST    **SHARE this note so that the co-signing nurse is able to place an eSignature**    Co-signer eSignature: Electronically signed by Roslyn Bolanos RN on 1/6/24 at 7:38 AM EST

## 2024-01-06 NOTE — PLAN OF CARE
Problem: Discharge Planning  Goal: Discharge to home or other facility with appropriate resources  Outcome: Progressing  Flowsheets (Taken 1/5/2024 1720)  Discharge to home or other facility with appropriate resources: Refer to discharge planning if patient needs post-hospital services based on physician order or complex needs related to functional status, cognitive ability or social support system     Problem: Skin/Tissue Integrity  Goal: Absence of new skin breakdown  Description: 1.  Monitor for areas of redness and/or skin breakdown  2.  Assess vascular access sites hourly  3.  Every 4-6 hours minimum:  Change oxygen saturation probe site  4.  Every 4-6 hours:  If on nasal continuous positive airway pressure, respiratory therapy assess nares and determine need for appliance change or resting period.  Outcome: Progressing

## 2024-01-06 NOTE — PROGRESS NOTES
01/05/24 2328   Patient Observation   Pulse 98   Respirations 18   SpO2 99 %   Breath Sounds   Breath Sounds Bilateral Diminished   Vent Information   Equipment Changed HME   Vent Mode AC/PRVC   Ventilator Settings   FiO2  80 %   Insp Time (sec) 1.1 sec   Vt (Set, mL) 350 mL   Resp Rate (Set) 18 bpm   PEEP/CPAP (cmH2O) 5   Vent Patient Data (Readings)   Vt (Measured) 375 mL   Peak Inspiratory Pressure (cmH2O) 14 cmH2O   Rate Measured 18 br/min   Minute Volume (L/min) 7.08 Liters   Mean Airway Pressure (cmH2O) 8.2 cmH20   Plateau Pressure (cm H2O) 0 cm H2O   Driving Pressure -5   I:E Ratio 1:2.00   Flow Sensitivity 3 L/min   I Time/ I Time % 1 s   Vent Alarm Settings   High Pressure (cmH2O) 45 cmH2O   Low Minute Volume (lpm) 2 L/min   High Minute Volume (lpm) 20 L/min   Low Exhaled Vt (ml) 200 mL   RR High (bpm) 40 br/min   Apnea (secs) 20 secs   Additional Respiratoray Assessments   Humidification Source HME   Ambu Bag With Mask At Bedside Yes   ETT    Placement Date: 01/05/24   Placed By: In ED  Placement Verified By: Chest X-ray;Colorimetric ETCO2 device  Preoxygenation: Yes  Mask Ventilation: Ventilated by mask (1)  Airway Type: Cuffed  Airway Tube Size: 7.5 mm  Location: Oral  Secured At: 23 cm ...   Secured At 23 cm   Measured From Lips   ETT Placement Center   Secured By Commercial tube young   Cuff Pressure 30 cm H2O   Tie/Young Changed No

## 2024-01-06 NOTE — DISCHARGE SUMMARY
Hospital Medicine  Death/Discharge Summary    Name:Veronica Barton       :  1940              MRN:  [id number]    Admit date:  2024    Discharge date:  2024    Admitting Physician:  Tommy Wray MD  Discharge Physician: Atiya Wyman MD          Admission Condition:  critical    Discharged Condition:      History of Present Illness: 83 y.o. female who presented to [facility] with SOB.  No significant PMHx was reported. Patients family provided initial history.  She has had several days of URI symptoms along with poor appetite/PO intake.  Family had also noted increasing LE edema. Initial work-up showed dense right basilar pneumonia. VS were initially stable although initial labs showed CONSTANTINO, hyponatremia, metabolic acidosis and Lactate was 4.8. BNP was >70,000 however. She was started on Rocephin/Azithromycin and IVF hydration. She received two doses of IV morphine for pain. Later in her ED course, she began having respiratory distress and appeared to have a respiratory arrest. Code was called and she was noted to be pulseless, with an initial rhythm of PEA. ACLS was provided. After several rounds of CPR/Epinephrine, ROSC was obtained. Family had departed prior to the arrest. Family arrived shortly after the code and acknowledged wishes to maintain vent support for now. They were contemplating DNR status. CVC was placed. Vasopressors were started. The patient had a 2nd period of Cardiac Arrest, followed by ROSC again. At that point, the family requested change to DNR status. Patient was transferred to the ICU for further management.   Pt prognosis poor in setting of multiple arrests and evidence of anoxic encephalopathy.  Family planning on withdrawing care today.    Discharge Physical Exam:    I was Called by nurse that patient had become asystole on telemetry. Patient seen and examined. No palpable pulse. Pupils fixed and dilated. No cardiac or pulmonary activity.  Patient pronounced dead.     Time of Death: 1912    Cause of Death: cardiorespiratory arrest, anoxic brain injury, pneumonia, acute resp failure with hypoxia, circulatory shock        Disposition:   [facility]    Time spent on Discharged < 30 minutes      Signed:  Atiya Wyman MD, MD  1/7/2024, 6:56 AM

## 2024-01-06 NOTE — PROGRESS NOTES
Hospitalist ICU Progress Note    Name:  Veronica Barton /Age/Sex: 1940  (83 y.o. female)   MRN & CSN:  [id number] & [id number] Encounter Date/Time: 2024 9:04 AM EST   Location:   PCP: Cal Raphael MD     Attending:Atiya Wyman MD       CC: Cardiopulmonary arrest with successful resuscitation (HCC)    Hospital course:  83 y.o. female who presented to [facility] with SOB.  No significant PMHx was reported. Patients family provided initial history.  She has had several days of URI symptoms along with poor appetite/PO intake.  Family had also noted increasing LE edema. Initial work-up showed dense right basilar pneumonia. VS were initially stable although initial labs showed CONSTANTINO, hyponatremia, metabolic acidosis and Lactate was 4.8. BNP was >70,000 however. She was started on Rocephin/Azithromycin and IVF hydration. She received several doses of IV morphine for pain. Later in her ED course, she began having respiratory distress and appeared to have a respiratory arrest. Code was called and she was noted to be pulseless, with an initial rhythm of PEA. ACLS was provided. After several rounds of CPR/Epinephrine, ROSC was obtained. Family had departed prior to the arrest. Family arrived shortly after the code and acknowledged wishes to maintain vent support for now. They were contemplating DNR status. CVC was placed. Vasopressors were started. The patient had a 2nd period of Cardiac Arrest, followed by ROSC again. At that point, the family requested change to DNR status. Patient was transferred to the ICU for further management.   Pt prognosis poor in setting of multiple arrests and evidence of anoxic encephalopathy.  Family possibly planning on withdrawing care today.    Admit date: 2024  Days in hospital:  1    24 Hour Events: pt not waking up, having continuous semi solid BMs    Subjective: pt family had questions about medications given  126* 131*   K 5.7* 5.5*   CL 93* 99   CO2 16* 20*   BUN 64* 76*   CREATININE 1.3* 1.9*     Mag: No results for input(s): \"MAG\" in the last 72 hours.  LIVER PROFILE:   Recent Labs     01/05/24  0843   AST 83*   ALT 83*   BILITOT 0.4   ALKPHOS 119     PT/INR: No results for input(s): \"PROTIME\", \"INR\" in the last 72 hours.  APTT: No results for input(s): \"APTT\" in the last 72 hours.  UA:No results for input(s): \"NITRITE\", \"COLORU\", \"PHUR\", \"LABCAST\", \"WBCUA\", \"RBCUA\", \"MUCUS\", \"TRICHOMONAS\", \"YEAST\", \"BACTERIA\", \"CLARITYU\", \"SPECGRAV\", \"LEUKOCYTESUR\", \"UROBILINOGEN\", \"BILIRUBINUR\", \"BLOODU\", \"GLUCOSEU\", \"AMORPHOUS\" in the last 72 hours.    Invalid input(s): \"KETONESU\"    ABG:   Lab Results   Component Value Date/Time    PHART 7.432 01/06/2024 07:30 AM    VQJ1UUX 28.7 01/06/2024 07:30 AM    PO2ART 79.5 01/06/2024 07:30 AM    GET8UDS 18.7 01/06/2024 07:30 AM    BEART -4.6 01/06/2024 07:30 AM    SUW8IZA 19.6 01/06/2024 07:30 AM    V0DXUMLP 94.0 01/06/2024 07:30 AM       Lab Results   Component Value Date    CALCIUM 8.0 (L) 01/06/2024             Electronically signed by Atiya Wyman MD on 1/6/2024 at 9:10 AM

## 2024-01-06 NOTE — PROGRESS NOTES
Shift: [id number]    Admitting diagnosis: PNA    Presentation to hospital: SOB    Surgery: no     Nursing assessment at handoff  stable    Emergency Contact/POA:Daughter  Family updated: yes - at bedside    Most recent vitals: BP (!) 95/54   Pulse 89   Temp 99.7 °F (37.6 °C) (Bladder)   Resp 18   Ht 1.473 m (4' 10\") Comment: estimate  Wt 45.4 kg (100 lb 1.4 oz)   SpO2 100%   BMI 20.92 kg/m²      Rhythm: Normal Sinus Rhythm      NC/HFNC-  n/a  Respiratory support: - Ventilator Settings:    Vt (Set, mL): 350 mL  Resp Rate (Set): 18 bpm  FiO2 : 70 %    PEEP/CPAP (cmH2O): 5       Vent days: Day 1    Increased O2 requirements: yes -     Admission weight Weight - Scale: 40.4 kg (89 lb)  Today's weight 44.7 kg    Wt Readings from Last 1 Encounters:   01/06/24 45.4 kg (100 lb 1.4 oz)         UOP >30ml/hr: no , mckeon placed in ED, no  urine output    Mckeon need assessed each shift: yes,     Restraints: no  Order current and documentation up to date?    Lines/Drains  LDA Insertion Date Discontinued Date Dressing Changes   PIV  1/5 x2     TLC  1/5 R fem.     Arterial       Mckeon  1/5     Vas Cath      ETT  1/5     Surgical drains        Night Shift Hospitalist Interventions    Problem(Brief) Date Time Intervention Physician contacted                                               Drip rates at handoff:    sodium chloride Stopped (01/05/24 1358)    sodium chloride      midazolam Stopped (01/05/24 2233)    EPINEPHrine 2 mcg/min (01/06/24 0612)    norepinephrine Stopped (01/05/24 2206)       Hospital Course Daily Updates:  Admit Day# 1/5/24 Days  -Coded in ED x2 & intubated  - Code status changed to Limited x4,  - Versed for sedation  - Epi & Levo for B/p control  - mckeon placed in ED, no urine output  - waiting for family members to arrive  - NG right nare, 45 cm  - ETT , 22 lip  - Lactic 11 in ED  - Troponin 130 in ED  - 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

## 2024-01-06 NOTE — PROGRESS NOTES
INPATIENT PULMONARY CRITICAL CARE PROGRESS NOTE      Reason for visit    Acute respiratory failure        SUBJECTIVE: Patient when seen this morning continues to be critically ill on mechanical support, patient was on 70% oxygen PEEP of 5 to maintain oxygen saturation, patient was not hypothermic now and patient was having a Tmax of 99.7 °F, patient was on IV sedation overnight to maintain patient ventilator synchrony, patient continues to be on IV pressors to get maintain hemodynamics, patient does not have any significant urine output overnight, patient glycemic control was acceptable, no other pertinent review of system of concern which could be elicited because of patient clinical status which remains precarious and critical               Physical Exam:  Blood pressure (!) 81/48, pulse 81, temperature 99.4 °F (37.4 °C), temperature source Bladder, resp. rate 18, height 1.473 m (4' 10\"), weight 45.4 kg (100 lb 1.4 oz), SpO2 97 %.'     Constitutional: In no significant respiratory distress on mechanical vent support when seen  HENT: Endotracheal tube present no thyromegaly.  Eyes:  Conjunctivae are normal. Pupils equal, round, and sluggish reactive to light. No scleral icterus.   Neck: . No tracheal deviation present. No obvious thyroid mass.   Cardiovascular: Normal rate, regular rhythm, normal heart sounds.  No right ventricular heave.  1+ lower extremity edema.  Pulmonary/Chest: No wheezes.  Bilateral rales.  Chest wall is not dull to percussion.  Increased accessory muscle usage, no stridor.  Decreased breath sound intensity  Abdominal: Soft. Bowel sounds present. No distension or hernia. No tenderness.    Musculoskeletal: No cyanosis. No clubbing. No obvious joint deformity.   Lymphadenopathy: No cervical or supraclavicular adenopathy.   Skin: Skin is warm and dry. No rash or nodules on the exposed extremities.  Neurologic:Intubated and encephalopathic    Results:  CBC:   Recent Labs     01/05/24  0843

## 2024-01-06 NOTE — PROGRESS NOTES
Medicine Addendum    Family elected to terminally extubate due to poor prognosis.      ECHO shows significantly poor function indicating some chronic disease with possible further depressed EF from cardioresp arrest:     Summary    -The left ventricular systolic function is severely reduced with an ejection    fraction of 15 -20%.    -There is global hypokinesis with regional wall motion variation.    -Grade II diastolic dysfunction with elevated filling pressure.    -Mild mitral regurgitation.    -At least moderate aortic stenosis.    -Moderate aortic regurgitation.    -Right ventricular systolic function is mildly reduced .    -Mild to moderate tricuspid regurgitation.    -Systolic pulmonic artery pressure (SPAP) is estimated at 51 mmHg consistent    with mild pulmonary hypertension (Right atrial pressure of 8 mmHg).     Electronically signed by Atiya Wyman MD on 1/6/2024 at 3:25 PM

## 2024-01-06 NOTE — PROGRESS NOTES
Followed up with Pt's family at bedside to offer support. Family stated they had been praying over Pt and had no other needs at this time. Assured them of our continued availability for support.    Ibrahima Gold         01/06/24 1623   Encounter Summary   Encounter Overview/Reason  Family Care   Service Provided For: Family   Referral/Consult From: [facility]   Support System Family members   Last Encounter    (1/6 offered sppt to family, no needs at this time)   Complexity of Encounter Low   Begin Time 1620   End Time  1624   Total Time Calculated 4 min

## 2024-01-06 NOTE — PROGRESS NOTES
01/05/24 1931   Patient Observation   Pulse 98   Respirations 23   Breath Sounds   Right Upper Lobe Clear;Diminished   Right Middle Lobe Diminished   Right Lower Lobe Rhonchi   Left Upper Lobe Rhonchi   Left Lower Lobe Rhonchi   Vent Information   Vent Mode AC/PRVC   Ventilator Settings   FiO2  100 %   Vt (Set, mL) 350 mL   Resp Rate (Set) 18 bpm   PEEP/CPAP (cmH2O) 5   Vent Patient Data (Readings)   Vt (Measured) 786 mL   Peak Inspiratory Pressure (cmH2O) 21 cmH2O   Rate Measured 21 br/min   Minute Volume (L/min) 8.28 Liters   Mean Airway Pressure (cmH2O) 11 cmH20   Plateau Pressure (cm H2O) 0 cm H2O   Driving Pressure -5   I:E Ratio 1:2.00   Flow Sensitivity 3 L/min   I Time/ I Time % 1 s   Vent Alarm Settings   High Pressure (cmH2O) 45 cmH2O   Low Minute Volume (lpm) 2 L/min   High Minute Volume (lpm) 20 L/min   Low Exhaled Vt (ml) 200 mL   RR High (bpm) 40 br/min   Apnea (secs) 20 secs   Additional Respiratoray Assessments   Humidification Source HME   Ambu Bag With Mask At Bedside Yes   Airway Clearance   Suction ET Tube   Suction Device Inline suction catheter   Sputum Method Obtained Endotracheal   Sputum Amount Small   Sputum Color/Odor Bloody   Sputum Consistency Thin   ETT    Placement Date: 01/05/24   Placed By: In ED  Placement Verified By: Chest X-ray;Colorimetric ETCO2 device  Preoxygenation: Yes  Mask Ventilation: Ventilated by mask (1)  Airway Type: Cuffed  Airway Tube Size: 7.5 mm  Location: Oral  Secured At: 23 cm ...   Secured At 23 cm   Measured From Lips   ETT Placement Right   Secured By Commercial tube burkett   Cuff Pressure 30 cm H2O

## 2024-01-06 NOTE — PROGRESS NOTES
01/06/24 0841   Patient Observation   Pulse 91   Respirations 15   SpO2 95 %   Breath Sounds   Breath Sounds Bilateral Clear   Vent Information   Vent Mode AC/VC   Ventilator Settings   FiO2  40 %   Insp Time (sec) 1.1 sec   Vt (Set, mL) 350 mL   Resp Rate (Set) 18 bpm   PEEP/CPAP (cmH2O) 5   Target Vt 1   Vent Patient Data (Readings)   Vt (Measured) 328 mL   Peak Inspiratory Pressure (cmH2O) 11 cmH2O   Rate Measured 19 br/min   Minute Volume (L/min) 7.6 Liters   Mean Airway Pressure (cmH2O) 6.8 cmH20   Inspiratory Time 1.1 sec   I:E Ratio 1:2.03   Flow Sensitivity 3 L/min   Vent Alarm Settings   High Pressure (cmH2O) 45 cmH2O   Low Minute Volume (lpm) 2 L/min   High Minute Volume (lpm) 20 L/min   Apnea (secs) 20 secs   Additional Respiratoray Assessments   Humidification Source HME   Circuit Condensation Not drained   Ambu Bag With Mask At Bedside Yes   Airway Clearance   Suction ET Tube   Suction Device Inline suction catheter   Sputum Amount Scant   Sputum Color/Odor Pink tinged   [REMOVED] ETT    Removal Date/Time: 01/06/24 1125  Placement Date: 01/05/24   Placed By: In ED  Placement Verified By: Chest X-ray;Colorimetric ETCO2 device  Preoxygenation: Yes  Mask Ventilation: Ventilated by mask (1)  Airway Type: Cuffed  Airway Tube Size: 7.5 mm  ...   Secured At 23 cm   Measured From Lips   ETT Placement Left   Secured By Commercial tube burkett   Site Assessment Dry   Cuff Pressure 24 cm H2O

## 2024-01-06 NOTE — PROGRESS NOTES
01/06/24 0346   Patient Observation   Pulse 90   Respirations 18   SpO2 100 %   Breath Sounds   Breath Sounds Bilateral Diminished   Vent Information   Vent Mode AC/PRVC   Ventilator Settings   FiO2  80 %   Insp Time (sec) 1.1 sec   Vt (Set, mL) 350 mL   Resp Rate (Set) 18 bpm   PEEP/CPAP (cmH2O) 5   Vent Patient Data (Readings)   Vt (Measured) 392 mL   Peak Inspiratory Pressure (cmH2O) 14 cmH2O   Rate Measured 19 br/min   Minute Volume (L/min) 7.29 Liters   Mean Airway Pressure (cmH2O) 8.9 cmH20   Plateau Pressure (cm H2O) 0 cm H2O   Driving Pressure -5   I:E Ratio 1:2.00   Flow Sensitivity 3 L/min   I Time/ I Time % 1 s   Vent Alarm Settings   High Pressure (cmH2O) 45 cmH2O   Low Minute Volume (lpm) 2 L/min   High Minute Volume (lpm) 20 L/min   Low Exhaled Vt (ml) 200 mL   RR High (bpm) 40 br/min   Apnea (secs) 20 secs   Additional Respiratoray Assessments   Humidification Source HME   Ambu Bag With Mask At Bedside Yes   Airway Clearance   Suction ET Tube   Suction Device Inline suction catheter   Sputum Method Obtained Endotracheal   Sputum Amount Small   Sputum Color/Odor Bloody   ETT    Placement Date: 01/05/24   Placed By: In ED  Placement Verified By: Chest X-ray;Colorimetric ETCO2 device  Preoxygenation: Yes  Mask Ventilation: Ventilated by mask (1)  Airway Type: Cuffed  Airway Tube Size: 7.5 mm  Location: Oral  Secured At: 23 cm ...   Secured At 23 cm   Measured From Lips   ETT Placement Center   Secured By Commercial tube young   Cuff Pressure 30 cm H2O   Tie/Young Changed No

## 2024-01-06 NOTE — PROGRESS NOTES
Shift: 0700 -1900    Admitting diagnosis: PNA    Presentation to hospital: SOB    Surgery: no     Nursing assessment at handoff  stable    Emergency Contact/POA:Daughter  Family updated: yes - at bedside    Most recent vitals: BP (!) 159/77   Pulse 98   Temp (!) 96.1 °F (35.6 °C) (Bladder)   Resp 23   Wt 44.7 kg (98 lb 8.7 oz)   SpO2 100%   BMI 18.02 kg/m²      Rhythm: Normal Sinus Rhythm      NC/HFNC-  lpm  Respiratory support: - Ventilator Settings:    Vt (Set, mL): 350 mL  Resp Rate (Set): 18 bpm  FiO2 : 100 %    PEEP/CPAP (cmH2O): 5       Vent days: Day 1    Increased O2 requirements: yes -     Admission weight Weight - Scale: 40.4 kg (89 lb)  Today's weight 44.7 kg    Wt Readings from Last 1 Encounters:   01/05/24 44.7 kg (98 lb 8.7 oz)         UOP >30ml/hr: no , mckeon placed in ED, no  urine output    Mckeon need assessed each shift: yes,     Restraints: no  Order current and documentation up to date?    Lines/Drains  LDA Insertion Date Discontinued Date Dressing Changes   PIV  1/5 x2     TLC  1/5 R fem.     Arterial       Mckeon  1/5     Vas Cath      ETT  1/5     Surgical drains        Night Shift Hospitalist Interventions    Problem(Brief) Date Time Intervention Physician contacted                                               Drip rates at handoff:    sodium chloride Stopped (01/05/24 1358)    sodium chloride      midazolam 2 mg/hr (01/05/24 1420)    EPINEPHrine 2 mcg/min (01/05/24 1720)    norepinephrine 9 mcg/min (01/05/24 1845)       Hospital Course Daily Updates:  Admit Day# 1/5/24 Days  -Coded in ED x2 & intubated  - Code status changed to Limited x4,  - Versed for sedation  - Epi & Levo for B/p control  - mckeon placed in ED, no urine output  - waiting for family members to arrive  - NG right nare, 45 cm  - ETT , 22 lip  - Lactic 11 in ED  - Troponin 130 in ED  - 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

## 2024-01-09 LAB
BACTERIA BLD CULT ORG #2: NORMAL
BACTERIA BLD CULT: NORMAL